# Patient Record
Sex: FEMALE | Race: BLACK OR AFRICAN AMERICAN | Employment: UNEMPLOYED | URBAN - METROPOLITAN AREA
[De-identification: names, ages, dates, MRNs, and addresses within clinical notes are randomized per-mention and may not be internally consistent; named-entity substitution may affect disease eponyms.]

---

## 2018-10-15 ENCOUNTER — HOSPITAL ENCOUNTER (EMERGENCY)
Age: 33
Discharge: HOME | End: 2018-10-15

## 2018-10-15 VITALS
BODY MASS INDEX: 37.21 KG/M2 | HEIGHT: 63 IN | TEMPERATURE: 97.7 F | OXYGEN SATURATION: 98 % | HEART RATE: 63 BPM | DIASTOLIC BLOOD PRESSURE: 82 MMHG | RESPIRATION RATE: 15 BRPM | SYSTOLIC BLOOD PRESSURE: 160 MMHG | WEIGHT: 210 LBS

## 2018-10-15 DIAGNOSIS — L02.91 ABSCESS: Primary | ICD-10-CM

## 2018-10-15 PROCEDURE — 99282 EMERGENCY DEPT VISIT SF MDM: CPT

## 2018-10-15 RX ORDER — SULFAMETHOXAZOLE AND TRIMETHOPRIM 800; 160 MG/1; MG/1
1 TABLET ORAL 2 TIMES DAILY
Qty: 14 TABLET | Refills: 0 | Status: SHIPPED | OUTPATIENT
Start: 2018-10-15 | End: 2018-10-22

## 2018-10-15 RX ORDER — CEPHALEXIN 500 MG/1
500 CAPSULE ORAL 4 TIMES DAILY
Qty: 28 CAPSULE | Refills: 0 | Status: SHIPPED | OUTPATIENT
Start: 2018-10-15 | End: 2018-10-22

## 2018-10-15 ASSESSMENT — ENCOUNTER SYMPTOMS
NAUSEA: 0
ROS SKIN COMMENTS: POSSIBLE ABSCESS, SEE HPI
VOMITING: 0

## 2018-10-15 ASSESSMENT — PAIN DESCRIPTION - ORIENTATION: ORIENTATION: LEFT

## 2018-10-15 ASSESSMENT — PAIN SCALES - GENERAL: PAINLEVEL_OUTOF10: 6

## 2019-01-10 ENCOUNTER — HOSPITAL ENCOUNTER (EMERGENCY)
Age: 34
Discharge: HOME | End: 2019-01-10
Attending: EMERGENCY MEDICINE
Payer: COMMERCIAL

## 2019-01-10 VITALS
WEIGHT: 198 LBS | TEMPERATURE: 98.5 F | SYSTOLIC BLOOD PRESSURE: 119 MMHG | DIASTOLIC BLOOD PRESSURE: 82 MMHG | BODY MASS INDEX: 35.08 KG/M2 | HEIGHT: 63 IN | HEART RATE: 57 BPM | RESPIRATION RATE: 16 BRPM | OXYGEN SATURATION: 99 %

## 2019-01-10 DIAGNOSIS — N93.9 ABNORMAL VAGINAL BLEEDING: Primary | ICD-10-CM

## 2019-01-10 LAB
A/G RATIO: 1.2 (ref 1.1–2.2)
ALBUMIN SERPL-MCNC: 4 G/DL (ref 3.4–5)
ALP BLD-CCNC: 69 U/L (ref 40–129)
ALT SERPL-CCNC: 10 U/L (ref 10–40)
ANION GAP SERPL CALCULATED.3IONS-SCNC: 8 MMOL/L (ref 3–16)
APTT: 33.8 SEC (ref 26–36)
AST SERPL-CCNC: 21 U/L (ref 15–37)
BACTERIA: ABNORMAL /HPF
BASOPHILS ABSOLUTE: 0 K/UL (ref 0–0.2)
BASOPHILS RELATIVE PERCENT: 0.2 %
BILIRUB SERPL-MCNC: <0.2 MG/DL (ref 0–1)
BILIRUBIN URINE: NEGATIVE
BLOOD, URINE: ABNORMAL
BUN BLDV-MCNC: 6 MG/DL (ref 7–20)
CALCIUM SERPL-MCNC: 8.4 MG/DL (ref 8.3–10.6)
CHLORIDE BLD-SCNC: 105 MMOL/L (ref 99–110)
CLARITY: ABNORMAL
CO2: 24 MMOL/L (ref 21–32)
COLOR: YELLOW
CREAT SERPL-MCNC: 0.6 MG/DL (ref 0.6–1.1)
EOSINOPHILS ABSOLUTE: 0.2 K/UL (ref 0–0.6)
EOSINOPHILS RELATIVE PERCENT: 4.2 %
EPITHELIAL CELLS, UA: 3 /HPF (ref 0–5)
GFR AFRICAN AMERICAN: >60
GFR NON-AFRICAN AMERICAN: >60
GLOBULIN: 3.4 G/DL
GLUCOSE BLD-MCNC: 115 MG/DL (ref 70–99)
GLUCOSE URINE: NEGATIVE MG/DL
HCG(URINE) PREGNANCY TEST: NEGATIVE
HCT VFR BLD CALC: 39.3 % (ref 36–48)
HEMOGLOBIN: 13.1 G/DL (ref 12–16)
HYALINE CASTS: 0 /LPF (ref 0–8)
INR BLD: 0.95 (ref 0.86–1.14)
KETONES, URINE: NEGATIVE MG/DL
LEUKOCYTE ESTERASE, URINE: ABNORMAL
LYMPHOCYTES ABSOLUTE: 1.7 K/UL (ref 1–5.1)
LYMPHOCYTES RELATIVE PERCENT: 37.9 %
MCH RBC QN AUTO: 31.6 PG (ref 26–34)
MCHC RBC AUTO-ENTMCNC: 33.3 G/DL (ref 31–36)
MCV RBC AUTO: 95.1 FL (ref 80–100)
MICROSCOPIC EXAMINATION: YES
MONOCYTES ABSOLUTE: 0.3 K/UL (ref 0–1.3)
MONOCYTES RELATIVE PERCENT: 7.2 %
NEUTROPHILS ABSOLUTE: 2.2 K/UL (ref 1.7–7.7)
NEUTROPHILS RELATIVE PERCENT: 50.5 %
NITRITE, URINE: NEGATIVE
PDW BLD-RTO: 16.8 % (ref 12.4–15.4)
PH UA: 6.5
PLATELET # BLD: 337 K/UL (ref 135–450)
PMV BLD AUTO: 7.8 FL (ref 5–10.5)
POTASSIUM SERPL-SCNC: 4.3 MMOL/L (ref 3.5–5.1)
PROTEIN UA: NEGATIVE MG/DL
PROTHROMBIN TIME: 10.8 SEC (ref 9.8–13)
RBC # BLD: 4.14 M/UL (ref 4–5.2)
RBC UA: 356 /HPF (ref 0–4)
SODIUM BLD-SCNC: 137 MMOL/L (ref 136–145)
SPECIFIC GRAVITY UA: 1.02
TOTAL PROTEIN: 7.4 G/DL (ref 6.4–8.2)
URINE REFLEX TO CULTURE: YES
URINE TYPE: ABNORMAL
UROBILINOGEN, URINE: 0.2 E.U./DL
WBC # BLD: 4.4 K/UL (ref 4–11)
WBC UA: 1 /HPF (ref 0–5)

## 2019-01-10 PROCEDURE — 80053 COMPREHEN METABOLIC PANEL: CPT

## 2019-01-10 PROCEDURE — 85025 COMPLETE CBC W/AUTO DIFF WBC: CPT

## 2019-01-10 PROCEDURE — 87086 URINE CULTURE/COLONY COUNT: CPT

## 2019-01-10 PROCEDURE — 96374 THER/PROPH/DIAG INJ IV PUSH: CPT

## 2019-01-10 PROCEDURE — 85610 PROTHROMBIN TIME: CPT

## 2019-01-10 PROCEDURE — 84703 CHORIONIC GONADOTROPIN ASSAY: CPT

## 2019-01-10 PROCEDURE — 85730 THROMBOPLASTIN TIME PARTIAL: CPT

## 2019-01-10 PROCEDURE — 6360000002 HC RX W HCPCS: Performed by: PHYSICIAN ASSISTANT

## 2019-01-10 PROCEDURE — 99283 EMERGENCY DEPT VISIT LOW MDM: CPT

## 2019-01-10 PROCEDURE — 81001 URINALYSIS AUTO W/SCOPE: CPT

## 2019-01-10 RX ORDER — KETOROLAC TROMETHAMINE 30 MG/ML
30 INJECTION, SOLUTION INTRAMUSCULAR; INTRAVENOUS ONCE
Status: COMPLETED | OUTPATIENT
Start: 2019-01-10 | End: 2019-01-10

## 2019-01-10 RX ADMIN — KETOROLAC TROMETHAMINE 30 MG: 30 INJECTION, SOLUTION INTRAMUSCULAR at 15:25

## 2019-01-10 ASSESSMENT — ENCOUNTER SYMPTOMS
BACK PAIN: 0
VOMITING: 0
DIARRHEA: 0
SHORTNESS OF BREATH: 0
ABDOMINAL PAIN: 1
BLOOD IN STOOL: 0
NAUSEA: 0

## 2019-01-10 ASSESSMENT — PAIN SCALES - GENERAL: PAINLEVEL_OUTOF10: 5

## 2019-01-10 ASSESSMENT — PAIN DESCRIPTION - ORIENTATION: ORIENTATION: LOWER

## 2019-01-10 ASSESSMENT — PAIN DESCRIPTION - LOCATION: LOCATION: BACK;ABDOMEN

## 2019-01-11 LAB — URINE CULTURE, ROUTINE: NORMAL

## 2019-06-06 ENCOUNTER — HOSPITAL ENCOUNTER (EMERGENCY)
Age: 34
Discharge: HOME | End: 2019-06-06
Payer: COMMERCIAL

## 2019-06-06 VITALS
HEART RATE: 94 BPM | SYSTOLIC BLOOD PRESSURE: 144 MMHG | RESPIRATION RATE: 16 BRPM | DIASTOLIC BLOOD PRESSURE: 92 MMHG | WEIGHT: 198 LBS | TEMPERATURE: 98.6 F | BODY MASS INDEX: 35.07 KG/M2 | OXYGEN SATURATION: 97 %

## 2019-06-06 DIAGNOSIS — R23.8 SKIN IRRITATION: Primary | ICD-10-CM

## 2019-06-06 PROCEDURE — 99282 EMERGENCY DEPT VISIT SF MDM: CPT

## 2019-06-06 ASSESSMENT — ENCOUNTER SYMPTOMS
NAUSEA: 0
ABDOMINAL PAIN: 0
DIARRHEA: 0
COLOR CHANGE: 0
ANAL BLEEDING: 0
BLOOD IN STOOL: 0
RECTAL PAIN: 1
CONSTIPATION: 0
COUGH: 0
ABDOMINAL DISTENTION: 0
SHORTNESS OF BREATH: 0
RESPIRATORY NEGATIVE: 1
VOMITING: 0

## 2019-06-06 ASSESSMENT — PAIN SCALES - GENERAL: PAINLEVEL_OUTOF10: 10

## 2019-06-06 ASSESSMENT — PAIN DESCRIPTION - LOCATION: LOCATION: BUTTOCKS

## 2019-06-06 NOTE — ED PROVIDER NOTES
[address] [facility]        Pt Name: Juany Wakefield  MRN: [id number]  [place] 1985  Date of evaluation: 6/6/2019  Provider: KJ Briggs  PCP: No primary care provider on file. This patient was not seen and evaluated by the attending physician but available for consultation as needed. CHIEF COMPLAINT       Chief Complaint   Patient presents with    Abscess     Pt states she noticed abscess in butt crack area approx 2 days ago       HISTORY OF PRESENT ILLNESS   (Location/Symptom, Timing/Onset, Context/Setting, Quality, Duration, Modifying Factors, Severity)  Note limiting factors. Juany Wakefield is a 29 y.o. female with no significant past medical history who presents to the ED with complaint of pain to the rectal area. Patient states she is concerned she has an abscess. Patient states 2 days ago she started developing some irritation to the gluteal cleft near the rectum. Patient states pain worse with palpation and rubbing of the area. Denies any injury or trauma. Denies anal intercourse or foreign body insertion to the rectum. Denies changes in bowel movements, urinary symptoms, fever/chills, vaginal bleeding, vaginal discharge, rashes/lesions, abdominal pain, nausea/vomiting or decreased oral intake. Patient states pain is described as a burning rated 10/10. Nursing Notes were all reviewed and agreed with or any disagreements were addressed  in the HPI. REVIEW OF SYSTEMS    (2-9 systems for level 4, 10 or more for level 5)     Review of Systems   Constitutional: Negative for activity change, appetite change, chills and fever. Respiratory: Negative. Negative for cough and shortness of breath. Cardiovascular: Negative. Negative for chest pain, palpitations and leg swelling. Gastrointestinal: Positive for rectal pain.  Negative for abdominal distention, abdominal pain, anal bleeding, blood in stool, constipation, diarrhea, nausea and vomiting. Genitourinary: Negative for decreased urine volume, difficulty urinating, dysuria, flank pain, frequency, hematuria, pelvic pain, urgency, vaginal bleeding, vaginal discharge and vaginal pain. Musculoskeletal: Negative for arthralgias, myalgias, neck pain and neck stiffness. Skin: Negative for color change, pallor, rash and wound. Neurological: Negative for dizziness and light-headedness. Positives and Pertinent negatives as per HPI. Except as noted abovein the ROS, all other systems were reviewed and negative. PAST MEDICAL HISTORY     Past Medical History:   Diagnosis Date    BV (bacterial vaginosis)     DUB (dysfunctional uterine bleeding)          SURGICAL HISTORY     Past Surgical History:   Procedure Laterality Date     SECTION           CURRENTMEDICATIONS       Discharge Medication List as of 2019  1:09 AM      CONTINUE these medications which have NOT CHANGED    Details   MetroNIDAZOLE (FLAGYL PO) Take by mouthHistorical Med      ibuprofen (ADVIL;MOTRIN) 600 MG tablet Take 1 tablet by mouth every 6 hours as needed for Pain, Disp-40 tablet, R-0Print               ALLERGIES     Patient has no known allergies. FAMILYHISTORY     History reviewed. No pertinent family history.        SOCIAL HISTORY       Social History     Socioeconomic History    Marital status:      Spouse name: None    Number of children: None    Years of education: None    Highest education level: None   Occupational History    None   Social Needs    Financial resource strain: None    Food insecurity:     Worry: None     Inability: None    Transportation needs:     Medical: None     Non-medical: None   Tobacco Use    Smoking status: Current Every Day Smoker     Packs/day: 0.50     Types: Cigars    Smokeless tobacco: Never Used   Substance and Sexual Activity    Alcohol use: Yes     Comment: occasionally    Drug use: Yes     Types: Marijuana    Sexual activity: Yes     Partners: Male   Lifestyle    Physical activity:     Days per week: None     Minutes per session: None    Stress: None   Relationships    Social connections:     Talks on phone: None     Gets together: None     Attends Shinto service: None     Active member of club or organization: None     Attends meetings of clubs or organizations: None     Relationship status: None    Intimate partner violence:     Fear of current or ex partner: None     Emotionally abused: None     Physically abused: None     Forced sexual activity: None   Other Topics Concern    None   Social History Narrative    None       SCREENINGS             PHYSICAL EXAM    (up to 7 for level 4, 8 or more for level 5)     ED Triage Vitals [06/06/19 0032]   BP Temp Temp src Pulse Resp SpO2 Height Weight   (!) 144/92 98.6 °F (37 °C) -- 94 16 97 % -- 198 lb (89.8 kg)       Physical Exam   Constitutional: She is oriented to person, place, and time. She appears well-developed and well-nourished. HENT:   Head: Normocephalic and atraumatic. Right Ear: External ear normal.   Left Ear: External ear normal.   Eyes: Right eye exhibits no discharge. Left eye exhibits no discharge. Neck: Normal range of motion. Neck supple. Pulmonary/Chest: Effort normal. No stridor. No respiratory distress. Abdominal: Soft. Bowel sounds are normal. She exhibits no distension and no mass. There is no hepatosplenomegaly. There is no tenderness. There is no rigidity, no rebound, no guarding and no CVA tenderness. No hernia. Genitourinary:   Genitourinary Comments: Upon examination of the gluteal cleft patient has a area of skin breakdown and associated abrasion noted. Tenderness palpation of this area. No erythema or warmth. No induration or fluctuance. Does not involve the rectum. No bleeding or drainage. No vesicles. No evidence of herpes. Musculoskeletal: Normal range of motion.    Neurological: She is alert and oriented to person, place, and time. Skin: Skin is warm and dry. She is not diaphoretic. No erythema. No pallor. Psychiatric: She has a normal mood and affect. Her behavior is normal.       DIAGNOSTIC RESULTS   LABS:    Labs Reviewed - No data to display    All other labs were within normal range or not returned as of this dictation. EKG: All EKG's are interpreted by the Emergency Department Physician who either signs orCo-signs this chart in the absence of a cardiologist.  Please see their note for interpretation of EKG. RADIOLOGY:   Non-plain film images such as CT, Ultrasound and MRI are read by the radiologist. Plain radiographic images are visualized andpreliminarily interpreted by the  ED Provider with the below findings:        Interpretation [facility] Radiologist below, if available at the time of this note:    No orders to display     No results found. PROCEDURES   Unless otherwise noted below, none     Procedures    CRITICAL CARE TIME   N/A    CONSULTS:  None      EMERGENCY DEPARTMENT COURSE and DIFFERENTIALDIAGNOSIS/MDM:   Vitals:    Vitals:    06/06/19 0032   BP: (!) 144/92   Pulse: 94   Resp: 16   Temp: 98.6 °F (37 °C)   SpO2: 97%   Weight: 198 lb (89.8 kg)       Patient was given thefollowing medications:  Medications - No data to display    Patient is a 71-year-old female who presents ED with complaint of possible abscess. Upon examination patient does not have an abscess but has what appears to be some skin irritation and skin breakdown noted to the gluteal cleft. Most likely due to trauma from rubbing. No evidence of herpes. No evidence of induration, fluctuance or infection. Given history and physical examination likely suffering from significant irritation. Given topical cream to help soothe the irritation. Follow-up with PCP. Return ED for any worsening symptoms.   Low suspicion for abscess, cellulitis, herpes, syphilis, proctitis, fistula, fissure, hemorrhoid, rectal involvement or

## 2019-06-06 NOTE — LETTER
[facility] Emergency Department  [address] [facility], [address]             June 6, 2019    Patient: Kareem Phelps   YOB: 1985   Date of Visit: 6/6/2019       To Whom It May Concern:    Yon Sellers was seen and treated in our emergency department on 6/6/2019. She may return to work on 06/07/19. Sincerely,         CHECO Clifton PA-C/

## 2020-01-08 ENCOUNTER — HOSPITAL ENCOUNTER (EMERGENCY)
Age: 35
Discharge: HOME | End: 2020-01-08
Attending: EMERGENCY MEDICINE
Payer: COMMERCIAL

## 2020-01-08 VITALS
TEMPERATURE: 99.2 F | WEIGHT: 200 LBS | BODY MASS INDEX: 35.44 KG/M2 | RESPIRATION RATE: 14 BRPM | DIASTOLIC BLOOD PRESSURE: 93 MMHG | HEIGHT: 63 IN | HEART RATE: 73 BPM | OXYGEN SATURATION: 100 % | SYSTOLIC BLOOD PRESSURE: 155 MMHG

## 2020-01-08 LAB
A/G RATIO: 1.2 (ref 1.1–2.2)
ABO/RH: NORMAL
ALBUMIN SERPL-MCNC: 3.9 G/DL (ref 3.4–5)
ALP BLD-CCNC: 53 U/L (ref 40–129)
ALT SERPL-CCNC: 8 U/L (ref 10–40)
ANION GAP SERPL CALCULATED.3IONS-SCNC: 17 MMOL/L (ref 3–16)
ANTIBODY SCREEN: NORMAL
AST SERPL-CCNC: 27 U/L (ref 15–37)
BACTERIA WET PREP: ABNORMAL
BASOPHILS ABSOLUTE: 0.1 K/UL (ref 0–0.2)
BASOPHILS RELATIVE PERCENT: 1.3 %
BILIRUB SERPL-MCNC: <0.2 MG/DL (ref 0–1)
BUN BLDV-MCNC: 8 MG/DL (ref 7–20)
CALCIUM SERPL-MCNC: 8.4 MG/DL (ref 8.3–10.6)
CHLORIDE BLD-SCNC: 101 MMOL/L (ref 99–110)
CLUE CELLS: ABNORMAL
CO2: 18 MMOL/L (ref 21–32)
CREAT SERPL-MCNC: 0.5 MG/DL (ref 0.6–1.1)
EOSINOPHILS ABSOLUTE: 0.2 K/UL (ref 0–0.6)
EOSINOPHILS RELATIVE PERCENT: 4 %
EPITHELIAL CELLS WET PREP: ABNORMAL
GFR AFRICAN AMERICAN: >60
GFR NON-AFRICAN AMERICAN: >60
GLOBULIN: 3.2 G/DL
GLUCOSE BLD-MCNC: 95 MG/DL (ref 70–99)
GONADOTROPIN, CHORIONIC (HCG) QUANT: <5 MIU/ML
HCT VFR BLD CALC: 37.8 % (ref 36–48)
HEMOGLOBIN: 12.7 G/DL (ref 12–16)
LYMPHOCYTES ABSOLUTE: 3.1 K/UL (ref 1–5.1)
LYMPHOCYTES RELATIVE PERCENT: 49.5 %
MCH RBC QN AUTO: 31.9 PG (ref 26–34)
MCHC RBC AUTO-ENTMCNC: 33.7 G/DL (ref 31–36)
MCV RBC AUTO: 94.8 FL (ref 80–100)
MONOCYTES ABSOLUTE: 0.4 K/UL (ref 0–1.3)
MONOCYTES RELATIVE PERCENT: 5.9 %
NEUTROPHILS ABSOLUTE: 2.5 K/UL (ref 1.7–7.7)
NEUTROPHILS RELATIVE PERCENT: 39.3 %
PDW BLD-RTO: 16 % (ref 12.4–15.4)
PLATELET # BLD: 293 K/UL (ref 135–450)
PMV BLD AUTO: 9.1 FL (ref 5–10.5)
POTASSIUM REFLEX MAGNESIUM: 4.3 MMOL/L (ref 3.5–5.1)
RBC # BLD: 3.99 M/UL (ref 4–5.2)
RBC WET PREP: ABNORMAL
SODIUM BLD-SCNC: 136 MMOL/L (ref 136–145)
SOURCE WET PREP: ABNORMAL
TOTAL PROTEIN: 7.1 G/DL (ref 6.4–8.2)
TRICHOMONAS PREP: ABNORMAL
WBC # BLD: 6.2 K/UL (ref 4–11)
WBC WET PREP: ABNORMAL
YEAST WET PREP: ABNORMAL

## 2020-01-08 PROCEDURE — 86850 RBC ANTIBODY SCREEN: CPT

## 2020-01-08 PROCEDURE — 87491 CHLMYD TRACH DNA AMP PROBE: CPT

## 2020-01-08 PROCEDURE — 85025 COMPLETE CBC W/AUTO DIFF WBC: CPT

## 2020-01-08 PROCEDURE — 96372 THER/PROPH/DIAG INJ SC/IM: CPT

## 2020-01-08 PROCEDURE — 86900 BLOOD TYPING SEROLOGIC ABO: CPT

## 2020-01-08 PROCEDURE — 99283 EMERGENCY DEPT VISIT LOW MDM: CPT

## 2020-01-08 PROCEDURE — 6360000002 HC RX W HCPCS: Performed by: PHYSICIAN ASSISTANT

## 2020-01-08 PROCEDURE — 84702 CHORIONIC GONADOTROPIN TEST: CPT

## 2020-01-08 PROCEDURE — 87591 N.GONORRHOEAE DNA AMP PROB: CPT

## 2020-01-08 PROCEDURE — 86901 BLOOD TYPING SEROLOGIC RH(D): CPT

## 2020-01-08 PROCEDURE — 87210 SMEAR WET MOUNT SALINE/INK: CPT

## 2020-01-08 PROCEDURE — 80053 COMPREHEN METABOLIC PANEL: CPT

## 2020-01-08 RX ORDER — KETOROLAC TROMETHAMINE 30 MG/ML
30 INJECTION, SOLUTION INTRAMUSCULAR; INTRAVENOUS ONCE
Status: COMPLETED | OUTPATIENT
Start: 2020-01-08 | End: 2020-01-08

## 2020-01-08 RX ADMIN — KETOROLAC TROMETHAMINE 30 MG: 30 INJECTION, SOLUTION INTRAMUSCULAR at 06:55

## 2020-01-08 ASSESSMENT — PAIN SCALES - GENERAL
PAINLEVEL_OUTOF10: 4
PAINLEVEL_OUTOF10: 7
PAINLEVEL_OUTOF10: 7

## 2020-01-08 NOTE — ED PROVIDER NOTES
signs of PID. She is not pregnant and her abdominal exam is benign. No profound active bleeding on pelvic exam per PA. Wet prep with <1+ clue cells but this is without associated sx and likely related to blood rather than true BV, therefore will not treat at this time. During the patient's ED course, the patient was given:  Medications   ketorolac (TORADOL) injection 30 mg (30 mg Intramuscular Given 1/8/20 0655)        CLINICAL IMPRESSION  1. Abnormal uterine bleeding (AUB)        DISPOSITION  Erica Thurman was discharged to home in stable condition. I have discussed the findings of today's workup with the patient and addressed the patient's questions and concerns. Important warning signs as well as new or worsening symptoms which would necessitate immediate return to the ED were discussed. The plan is to discharge from the ED at this time, and the patient is in stable condition. The patient acknowledged understanding is agreeable with this plan. Follow-up with:  Your gynecologist    Schedule an appointment as soon as possible for a visit in 1 week  For symptom re-evaluation    [facility] Emergency Department  [address] [place] [address]  [phone]  Go to   If symptoms worsen      This chart was created using Dragon dictation software. Efforts were made by me to ensure accuracy, however some errors may be present due to limitations of this technology.             Lyndsay English MD  01/08/20 5332
Abdomen is soft. There is no mass. Tenderness: There is no tenderness. There is no guarding or rebound. Hernia: No hernia is present. Genitourinary:     Comments: Pelvic exam performed with female chaperone at bedside reveals no external rash, erythema or vesicle. There is moderate amount of vaginal bleeding noted on exam.  No vaginal laceration or foreign body noted. No masses. No clots or tissue. Musculoskeletal: Normal range of motion. General: No swelling. Skin:     General: Skin is warm and dry. Findings: No erythema or rash. Neurological:      Mental Status: She is alert and oriented to person, place, and time. Cranial Nerves: No cranial nerve deficit.    Psychiatric:         Behavior: Behavior normal.         DIAGNOSTIC RESULTS   LABS:    Labs Reviewed   WET PREP, GENITAL - Abnormal; Notable for the following components:       Result Value    Clue Cells, Wet Prep <1+ (*)     All other components within normal limits    Narrative:     Performed at:  [facility] [address], [place], [address]   Phone [phone]   CBC WITH AUTO DIFFERENTIAL - Abnormal; Notable for the following components:    RBC 3.99 (*)     RDW 16.0 (*)     All other components within normal limits    Narrative:     Performed at:  [facility] [address], [place], [address]   Phone [phone]   COMPREHENSIVE METABOLIC PANEL W/ REFLEX TO MG FOR LOW K - Abnormal; Notable for the following components:    CO2 18 (*)     Anion Gap 17 (*)     CREATININE 0.5 (*)     ALT 8 (*)     All other components within normal limits    Narrative:     Performed at:  [facility] [address], [place], [address]   Phone [phone]   C.TRACHOMATIS N.GONORRHOEAE DNA   HCG, QUANTITATIVE, PREGNANCY    Narrative:     Performed at:  [facility] [address], [place], [place]

## 2020-01-09 LAB
C TRACH DNA GENITAL QL NAA+PROBE: NEGATIVE
N. GONORRHOEAE DNA: NEGATIVE

## 2020-09-11 ENCOUNTER — HOSPITAL ENCOUNTER (EMERGENCY)
Age: 35
Discharge: HOME | End: 2020-09-11
Payer: COMMERCIAL

## 2020-09-11 VITALS
SYSTOLIC BLOOD PRESSURE: 136 MMHG | BODY MASS INDEX: 37.76 KG/M2 | HEART RATE: 72 BPM | RESPIRATION RATE: 18 BRPM | DIASTOLIC BLOOD PRESSURE: 97 MMHG | OXYGEN SATURATION: 100 % | WEIGHT: 213.19 LBS | TEMPERATURE: 98.1 F

## 2020-09-11 PROCEDURE — 99283 EMERGENCY DEPT VISIT LOW MDM: CPT

## 2020-09-11 RX ORDER — NAPROXEN 500 MG/1
500 TABLET ORAL 2 TIMES DAILY WITH MEALS
Qty: 20 TABLET | Refills: 0 | Status: SHIPPED | OUTPATIENT
Start: 2020-09-11 | End: 2020-09-30

## 2020-09-11 ASSESSMENT — PAIN DESCRIPTION - ORIENTATION
ORIENTATION: RIGHT
ORIENTATION: RIGHT

## 2020-09-11 ASSESSMENT — PAIN DESCRIPTION - LOCATION: LOCATION: HAND

## 2020-09-11 NOTE — ED NOTES
Bed: B-08  Expected date:   Expected time:   Means of arrival:   Comments:  Gianfranco Grover RN  09/11/20 1953

## 2020-09-12 NOTE — ED PROVIDER NOTES
[address]  [address] [address]  Dept: [phone]  Loc: [address] ENCOUNTER        This patient was not seen or evaluated by the attending physician. I evaluated this patient, the attending physician was available for consultation. CHIEF COMPLAINT    Chief Complaint   Patient presents with    Numbness     patient states that she cut her pinky finger two weeks ago. pt. states that she started having numbness in that finger and around the area 1 week ago. pt. states that she is now having shooting pains going up her hand. HPI    Shirley Martinez is a 36 y.o. female who presents with right finger pain and numbness. The onset was about 2 weeks ago. The duration has been constant since the onset. The patient has no other associated injury. The context is that she cut the outside of her right fifth finger about 2 weeks ago when she was putting away opened cans. She states that she immediately cleaned it out and kept covered and clean. She states that she felt numbness in her pinky at that time, but that it really did not bother her that much. Over the course of the last 2 weeks she notes that she has had some numbness and pain in her ring finger and her pinky finger. She is unsure if it is related to her cut or something else. She reports that sometimes this pain will shoot up into the outside of her hand and her wrist area. She notes that she is left-hand dominant. She has not tried taking anything to treat her symptoms. She has no further complaints at this time. REVIEW OF SYSTEMS    Skin: Healed laceration, no puncture wounds  Musculoskeletal: see HPI, no other joint or bony injury or pain  Neurologic: No loss of consciousness, no head injury, no paresthesias or focal distal extremity weakness  All other systems reviewed and are negative.     PAST MEDICAL & SURGICAL Atraumatic, moist mucous membranes  NECK: normal range of motion,  supple   Respiratory:  No respiratory distress  Cardiovascular:  No JVD  Vascular: right radial pulse 2+, capillary refill less than 2 seconds  Musculoskeletal:  + tenderness to palpation of the ulnar aspect of the wrist with positive Tinels sign, no edema, no deformity. Fifth finger with normal sharp sensation to ulnar aspect, which is the same side that the laceration was noted to have occurred, there is no residual laceration noted and no evidence of infection. There is no tenderness or swelling to the finger. With full strength with flexion against resistance and extension. Equal  strength bilaterally. Patient reports that also has similar sensation to her ring finger on the right side. Integument:  Well hydrated, no skin lacerations, no erythema  Neurologic:  Awake alert, no slurred speech, sensory and motor intact    RADIOLOGY   No orders to display       ED [facility]   See chart for medications given during emergency department course. Differential diagnosis: includes but not limited to Arterial Injury/Ischemia, Fracture, Dislocation, Infection, Compartment Syndrome, Neurologic Deficit/Injury. The patient had a laceration to the ulnar aspect of her right pinky finger. However on exam, the sharp dull sensation is not diminished on that ulnar aspect, but rather a little bit more diminished on the radial aspect of the fifth finger as well as both ulnar and radial aspects of the fourth finger on the right hand. She does not have any weakness to the extremity. She does not have any fusiform swelling, or tenderness with flexion and extension against resistance. She has brisk capillary refill. I do not feel that there is any evidence of infection, but given the positive Tinel sign the description of the shooting pain going up into her hand and her wrist, I do feel that she may have early carpal tunnel.  She declined to wait to have films done at this time and said she'd rather just get the splint and go. She will be placed in a right splint, and given referral to hand for further evaluation and management. The patient was instructed to follow up as an outpatient in 2 days. The patient was instructed to return to the ED immediately for any new or worsening symptoms. The patient verbalized understanding. FINAL IMPRESSION    1. Hand pain, right    2.  Paresthesias in right hand        PLAN  Discharge with outpatient follow-up and discharge instructions (see EMR)    (Please note that this note was completed with a voice recognition program.  Every attempt was made to edit the dictations, but inevitably there remain words that are mis-transcribed.)         Wolfgang Guerrero, [address]  09/11/20 1769

## 2020-09-30 ENCOUNTER — OFFICE VISIT (OUTPATIENT)
Dept: ORTHOPEDIC SURGERY | Age: 35
End: 2020-09-30
Payer: COMMERCIAL

## 2020-09-30 VITALS — WEIGHT: 213 LBS | TEMPERATURE: 98.2 F | BODY MASS INDEX: 37.74 KG/M2 | RESPIRATION RATE: 16 BRPM | HEIGHT: 63 IN

## 2020-09-30 PROCEDURE — 99203 OFFICE O/P NEW LOW 30 MIN: CPT | Performed by: ORTHOPAEDIC SURGERY

## 2020-09-30 RX ORDER — MULTIVITAMIN
1 TABLET ORAL DAILY
COMMUNITY

## 2020-09-30 NOTE — PATIENT INSTRUCTIONS
Thank you for choosing [facility]) Physicians for your Hand and Upper Extremity needs. If we can be of any further assistance to you, please do not hesitate to contact us.     Office Phone Number:  [phone]  or  [phone]

## 2020-09-30 NOTE — PROGRESS NOTES
examination reveals normal and good capillary refill bilaterally  Swelling is minimal about the elbow on the Right, normal on the Left  There is no evidence of gross joint instability bilaterally. Muscular strength is clinically appropriate bilaterally. Examination for Cubital Tunnel Syndrome on the right shows no tenderness to palpation at the Medial epicondyle. The Ulnar Nerve rests behind the medial epicondyle without subluxation upon elbow flexion. Elbow flexion-compression test is Mildly Positive, and there is an active Tinnel's Sign over the Cubital Tunnel . The Ulnar Nerve innervated intrinsic musculature is not atrophied & weakened. Examination for Stenosing Tenosynovitis demonstrates no evidence of tenderness, thickening or nodularity at the A-1 pulleys of the digits bilaterally. There no palpable triggering or any finger or thumb. Examination for Carpal Tunnel Syndrome shows Carpal Tunnel Compression Test to be negative bilaterally. Phalen's Maneuver is negative bilaterally. The thenar musculature shows no evidence of atrophy or weakness. Impression:  Ms. Ramses Sabillon is showing clinical evidence of Ulnar Nerve entrapment at the Cubital Tunnel and presents requesting further treatment. Plan:  I have had a thorough discussion with Ms. Ramses Sabillon regarding the treatment options available for her initially presenting right  cubital tunnel syndrome, which is causing her significant symptoms and difficulty. I have outlined for Ms. Ramses Sabillon the risk, benefits and consequences of the various treatment modalities, including a reasonable expectation for the long term success of each. We have discussed the likelihood that further, more aggressive treatment may be required for her current presenting condition.   Based upon our current discussion and a reasonable understating of the options available to her, Ms. Ramses Sabillon has selected to proceed with a conservative plan of treatment consisting of: attention to elbow positioning and pressure,  activity modification, and the judicious use of over-the-counter anti-inflammatory medications if allowed by her primary care physician. Instructions were given regarding the use of other modalities as appropriate. I have clearly explained to her that the above outlined treatment plan should not be expected to 'cure' her  cubital tunnel syndrome, but we are rather treating the symptoms with which she presents. She has understood that in order to achieve more durable relief of her symptoms and to prevent future worsening or further damage, that definitive surgical treatment would be required. Ms. Beny Myers  voiced an appropriate understanding of our discussion, the options available to her, and of the expectations of her selected  treatment. I have discussed with Ms. Beny Myers that I can not be certain of the diagnosis of a specific focal nerve entrapment clinically nor can I exclude another site of nerve compression by my evaluation today. We have discussed the option of pursuing  Electrodiagnostic Studies to more fully evaluate her presenting symptoms and the underlying cause, and a prescription for EMG & Nerve Conduction Studies was provided. Ms. Beny Myers has been given a full verbal list of instructions and precautions related to her present condition. I have asked her to followup with me in the office at the prescribed time. She is also specifically requested to call or return to the office sooner if her symptoms change or worsen prior to the next scheduled appointment.

## 2020-12-31 ENCOUNTER — TELEPHONE (OUTPATIENT)
Dept: ORTHOPEDIC SURGERY | Age: 35
End: 2020-12-31

## 2020-12-31 NOTE — TELEPHONE ENCOUNTER
Other PATIENT CALLING CONCERNING A REFERRAL, DO NOT KNOW THE NAME OF THE DOCTOR SHE WAS REFERRED TO., CALLBACK [phone].

## 2020-12-31 NOTE — TELEPHONE ENCOUNTER
Called and spoke to patient she wanted to know where she needed to go to have the EMG done. I gave her central scheduling number. She also wanted to get set up to be evaluated for her Cervical spine. I have scheduled her the first available with [facility]. As [facility] doesn't have a new patient for a few weeks. She understands that [facility] staff may call life they have any questions or if they need to make any changes to this appt.      Ph: [phone] ([address] [place] [address])

## 2021-01-19 ENCOUNTER — HOSPITAL ENCOUNTER (OUTPATIENT)
Dept: NEUROLOGY | Age: 36
Discharge: HOME | End: 2021-01-19
Payer: COMMERCIAL

## 2021-01-19 PROCEDURE — 95908 NRV CNDJ TST 3-4 STUDIES: CPT

## 2021-01-19 PROCEDURE — 95886 MUSC TEST DONE W/N TEST COMP: CPT

## 2021-01-19 NOTE — PROCEDURES
Test Date:  2021    Patient: Bernardino Johnston : 1985 Physician: Maximiliano Woods DO   Sex: Female ID#:  Ref Phys: Chadwick Eaton MD     Patient Complaints:  Patient is a 30year-old female who presents with pain in the right upper extremity radiating to the shoulder onset in September. with neck pain. Patient History / Exam:  PMH: no endocrine disease, no neck or arm surgery PE: reflexes trace, + thumb opposition weakness    NCV & EMG Findings:  All nerve conduction studies (as indicated in the following tables) were within normal limits. All examined muscles (as indicated in the following table) showed no evidence of electrical instability. Impression:  Normal study without evidence of an acute radiculopathy, entrapment neuropathy or other lower motor neuron dysfunction.  Thank you         Maximiliano Woods DO        Nerve Conduction Studies  Motor Nerve Results      Latency Amplitude F-Lat Segment Distance CV Comment   Site (ms) Norm (mV) Norm (ms)  (cm) (m/s) Norm    Right Median (APB) Motor   Wrist 3.2  < 4.2 6.3  > 5.0         Elbow 6.1 - 7.4 -  Elbow-Wrist 20 69  > 50    Right Ulnar (ADM) Motor   Wrist 1.60  < 4.2 5.8  > 3.0         Bel Elbow 6.0 - 5.5 -  Bel Elbow-Wrist 22 50  > 50    Abv Elbow 6.4 - 5.1 -  Abv Elbow-Bel Elbow 3 75  > 48      Sensory Nerve Results      Latency (Peak) Amplitude (P-P) Segment Distance CV Comment   Site (ms) Norm (µV) Norm  (cm) (m/s) Norm    Right Median Sensory   Wrist-Dig II 3.2  < 3.6 38  > 10 Wrist-Dig II 14 44  > 39    Right Ulnar Sensory   Wrist-Dig V 2.9  < 3.7 33  > 15 Wrist-Dig V 14 48  > 38        Electromyography     Side Muscle Nerve Root Ins Act Fibs Psw Amp Dur Poly Recrt Int Ostrander Lyme Comment   Right Deltoid Axillary C5-C6 Nml Nml Nml Nml Nml 0 Nml Nml    Right Biceps Musculocut C5-C6 Nml Nml Nml Nml Nml 0 Nml Nml    Right Triceps Radial C6-C8 Nml Nml Nml Nml Nml 0 Nml Nml    Right Brachiorad Radial C5-C6 Nml Nml Nml Nml Nml 0 Nml Nml Right Pronator Teres Median C6-C7 Nml Nml Nml Nml Nml 0 Nml Nml    Right EIP Post Interosseous,  R... C7-C8 Nml Nml Nml Nml Nml 0 Nml Nml    Right APB Median C8-T1 Nml Nml Nml Nml Nml 0 Nml Nml    Right FDI Ulnar C8-T1 Nml Nml Nml Nml Nml 0 Nml Nml    Right Cervical Paraspinal (Uppe. .. Rami C1-C3 Nml Nml Nml      diff relax   Right Cervical Paraspinal (Mid) Rami C4-C6 Nml Nml Nml         Right Cervical Paraspinal (Cornelious Friar. ..  Rami C7-C8 Nml Nml Nml             Electronically signed by Anju Giron DO on 1/19/2021 at 11:00 AM

## 2022-01-27 ENCOUNTER — HOSPITAL ENCOUNTER (EMERGENCY)
Age: 37
Discharge: HOME | End: 2022-01-27
Payer: COMMERCIAL

## 2022-01-27 ENCOUNTER — APPOINTMENT (OUTPATIENT)
Dept: GENERAL RADIOLOGY | Age: 37
End: 2022-01-27
Payer: COMMERCIAL

## 2022-01-27 VITALS
RESPIRATION RATE: 18 BRPM | TEMPERATURE: 98.4 F | HEART RATE: 76 BPM | DIASTOLIC BLOOD PRESSURE: 100 MMHG | WEIGHT: 198 LBS | OXYGEN SATURATION: 100 % | SYSTOLIC BLOOD PRESSURE: 148 MMHG | BODY MASS INDEX: 35.07 KG/M2

## 2022-01-27 DIAGNOSIS — M77.8 TENDINITIS OF RIGHT WRIST: ICD-10-CM

## 2022-01-27 DIAGNOSIS — M65.4 TENDINITIS, DE QUERVAIN'S: Primary | ICD-10-CM

## 2022-01-27 PROCEDURE — 99283 EMERGENCY DEPT VISIT LOW MDM: CPT

## 2022-01-27 PROCEDURE — 73110 X-RAY EXAM OF WRIST: CPT

## 2022-01-27 PROCEDURE — 29125 APPL SHORT ARM SPLINT STATIC: CPT

## 2022-01-27 RX ORDER — IBUPROFEN 600 MG/1
600 TABLET ORAL
Qty: 30 TABLET | Refills: 0 | Status: SHIPPED | OUTPATIENT
Start: 2022-01-27

## 2022-01-27 ASSESSMENT — PAIN SCALES - GENERAL: PAINLEVEL_OUTOF10: 10

## 2022-01-27 NOTE — ED PROVIDER NOTES
[facility]        Pt Name: Padmini Boston  MRN: [id number]  [place] 1985  Date of evaluation: 2022  Provider: Ella Menchaca PA-C  PCP: No primary care provider on file. Note Started: 8:47 AM EST       MONICA. I have evaluated this patient. My supervising physician was available for consultation. Marquita Dubon MD      CHIEF COMPLAINT       Chief Complaint   Patient presents with    Wrist Pain     Pt reports right wrist pain, no known injury        HISTORY OF PRESENT ILLNESS   (Location, Timing/Onset, Context/Setting, Quality, Duration, Modifying Factors, Severity, Associated Signs and Symptoms)  Note limiting factors. Chief Complaint: Nondominant right wrist pain    Padmini Boston is a 29 y.o. female who presents with progressive right wrist pain beginning about 12-24 hours ago. States low radial aspect. Worse with movement. Works in a [employer] with repetitive use. She is left-hand dominant. She has not had previous similar. No trauma. She has had no medication. She is come out to be evaluated. Reports no paresthesia. No chest pain or shortness of breath. No fevers or chills. Nursing Notes were all reviewed and agreed with or any disagreements were addressed in the HPI. REVIEW OF SYSTEMS    (2-9 systems for level 4, 10 or more for level 5)     Review of Systems    Positives and Pertinent negatives as per HPI. Except as noted above in the ROS, all other systems were reviewed and negative.        PAST MEDICAL HISTORY     Past Medical History:   Diagnosis Date    BV (bacterial vaginosis)     DUB (dysfunctional uterine bleeding)          SURGICAL HISTORY     Past Surgical History:   Procedure Laterality Date     SECTION           CURRENTMEDICATIONS       Previous Medications    MULTIPLE VITAMIN (ONE-A-DAY ESSENTIAL) TABS    Take 1 tablet by mouth daily         ALLERGIES     Patient has no known allergies. FAMILYHISTORY     History reviewed. No pertinent family history. SOCIAL HISTORY       Social History     Tobacco Use    Smoking status: Current Every Day Smoker     Packs/day: 0.50     Types: Cigars    Smokeless tobacco: Never Used   Vaping Use    Vaping Use: Never used   Substance Use Topics    Alcohol use: Yes     Comment: 2 glasses [place] per day    Drug use: Yes     Types: Marijuana (Weed)       SCREENINGS             PHYSICAL EXAM    (up to 7 for level 4, 8 or more for level 5)     ED Triage Vitals   BP Temp Temp src Pulse Resp SpO2 Height Weight   01/27/22 0829 01/27/22 0829 -- 01/27/22 0829 01/27/22 0829 01/27/22 0829 -- 01/27/22 0828   (!) 148/100 98.4 °F (36.9 °C)  76 18 100 %  198 lb (89.8 kg)       Physical Exam  Vitals and nursing note reviewed. Constitutional:       Appearance: Normal appearance. She is well-developed and normal weight. HENT:      Head: Normocephalic and atraumatic. Right Ear: External ear normal.      Left Ear: External ear normal.   Eyes:      General: No scleral icterus. Right eye: No discharge. Left eye: No discharge. Conjunctiva/sclera: Conjunctivae normal.   Cardiovascular:      Rate and Rhythm: Normal rate. Pulmonary:      Effort: Pulmonary effort is normal.   Musculoskeletal:         General: Tenderness present. Normal range of motion. Cervical back: Normal range of motion and neck supple. Comments: Patient with tenderness along the radial tendon. Positive Finkelstein test.  No crepitation. Wrist noted refill all digits including the first and second digits. Sensory intact without paresthesia. Skin:     General: Skin is warm and dry. Neurological:      General: No focal deficit present. Mental Status: She is alert and oriented to person, place, and time. Mental status is at baseline.    Psychiatric:         Mood and Affect: Mood normal.         Behavior: Behavior normal.         Thought Content: Thought content normal.         Judgment: Judgment normal.         DIAGNOSTIC RESULTS   LABS:    Labs Reviewed - No data to display    When ordered only abnormal lab results are displayed. All other labs were within normal range or not returned as of this dictation. EKG: When ordered, EKG's are interpreted by the Emergency Department Physician in the absence of a cardiologist.  Please see their note for interpretation of EKG. RADIOLOGY:   Non-plain film images such as CT, Ultrasound and MRI are read by the radiologist. Plain radiographic images are visualized and preliminarily interpreted by the ED Provider with the below findings:        Interpretation per the Radiologist below, if available at the time of this note:    XR WRIST RIGHT (MIN 3 VIEWS)   Final Result   No acute osseous abnormality. No results found. PROCEDURES     A Velcro thumb spica splint applied by staff. Inspection of separate is appropriate placement without neurovascular compromise       Procedures    CRITICAL CARE TIME       CONSULTS:  None      EMERGENCY DEPARTMENT COURSE and DIFFERENTIAL DIAGNOSIS/MDM:   Vitals:    Vitals:    01/27/22 0828 01/27/22 0829   BP:  (!) 148/100   Pulse:  76   Resp:  18   Temp:  98.4 °F (36.9 °C)   SpO2:  100%   Weight: 198 lb (89.8 kg) 198 lb (89.8 kg)       Patient was given the following medications:  Medications - No data to display        Presenting with several day history of progressive pain nondominant right wrist along the radial aspect. X-ray negative. Suspect tendinitis and potentially de Quervain's tendinitis. Thumb spica splint applied x1 week. Heat, stretch massage recommended. Start ibuprofen 600 mg 3 times daily. She may add Tylenol 1000 mg 3 times daily for additional pain control. Referred to orthopedics. Patient did express understanding of her diagnosis and the treatment plan. FINAL IMPRESSION      1. Tendinitis, de Quervain's    2.  Tendinitis of right wrist DISPOSITION/PLAN   DISPOSITION Decision To Discharge 01/27/2022 09:31:22 AM      PATIENT REFERRED TO:  Florencia Strauss MD  [facility], [address] [place] [address],[address]  [address] [place] [address]  [phone]    Schedule an appointment as soon as possible for a visit on 2/1/2022      Your healthcare provider    Schedule an appointment as soon as possible for a visit   As needed    [facility]  [facility]  [phone]  Go to   If symptoms worsen      DISCHARGE MEDICATIONS:  New Prescriptions    IBUPROFEN (ADVIL;MOTRIN) 600 MG TABLET    Take 1 tablet by mouth 3 times daily (with meals)       DISCONTINUED MEDICATIONS:  Discontinued Medications    No medications on file              (Please note that portions of this note were completed with a voice recognition program.  Efforts were made to edit the dictations but occasionally words are mis-transcribed. )    Ann Marie Davis PA-C (electronically signed)           Ann Marie Davis PA-C  01/27/22 [id number]

## 2025-03-03 ENCOUNTER — HOSPITAL ENCOUNTER (EMERGENCY)
Age: 40
Discharge: HOME | End: 2025-03-03

## 2025-03-03 VITALS
TEMPERATURE: 99.3 F | WEIGHT: 211 LBS | DIASTOLIC BLOOD PRESSURE: 92 MMHG | HEIGHT: 62 IN | RESPIRATION RATE: 16 BRPM | HEART RATE: 78 BPM | SYSTOLIC BLOOD PRESSURE: 161 MMHG | BODY MASS INDEX: 38.83 KG/M2 | OXYGEN SATURATION: 98 %

## 2025-03-03 DIAGNOSIS — I10 ELEVATED BLOOD PRESSURE READING WITH DIAGNOSIS OF HYPERTENSION: ICD-10-CM

## 2025-03-03 DIAGNOSIS — Z20.6 HIV EXPOSURE: Primary | ICD-10-CM

## 2025-03-03 DIAGNOSIS — Z11.3 SCREENING EXAMINATION FOR STD (SEXUALLY TRANSMITTED DISEASE): ICD-10-CM

## 2025-03-03 LAB
ALBUMIN SERPL-MCNC: 4.3 G/DL (ref 3.4–5)
ALBUMIN/GLOB SERPL: 1.4 {RATIO} (ref 1.1–2.2)
ALP SERPL-CCNC: 77 U/L (ref 40–129)
ALT SERPL-CCNC: 8 U/L (ref 10–40)
ANION GAP SERPL CALCULATED.3IONS-SCNC: 7 MMOL/L (ref 3–16)
AST SERPL-CCNC: 14 U/L (ref 15–37)
BACTERIA GENITAL QL WET PREP: NORMAL
BACTERIA URNS QL MICRO: ABNORMAL /HPF
BILIRUB SERPL-MCNC: <0.2 MG/DL (ref 0–1)
BILIRUB UR QL STRIP.AUTO: NEGATIVE
BUN SERPL-MCNC: 12 MG/DL (ref 7–20)
CALCIUM SERPL-MCNC: 9 MG/DL (ref 8.3–10.6)
CHLORIDE SERPL-SCNC: 102 MMOL/L (ref 99–110)
CLARITY UR: CLEAR
CLUE CELLS SPEC QL WET PREP: NORMAL
CO2 SERPL-SCNC: 28 MMOL/L (ref 21–32)
COLOR UR: YELLOW
CREAT SERPL-MCNC: 0.8 MG/DL (ref 0.6–1.1)
EPI CELLS #/AREA URNS HPF: ABNORMAL /HPF (ref 0–5)
EPI CELLS SPEC QL WET PREP: NORMAL
GFR SERPLBLD CREATININE-BSD FMLA CKD-EPI: >90 ML/MIN/{1.73_M2}
GLUCOSE SERPL-MCNC: 96 MG/DL (ref 70–99)
GLUCOSE UR STRIP.AUTO-MCNC: NEGATIVE MG/DL
HBV CORE IGM SERPL QL IA: NORMAL
HBV SURFACE AB SERPL IA-ACNC: <3.5 MIU/ML
HBV SURFACE AG SERPL QL IA: NORMAL
HCG UR QL: NEGATIVE
HGB UR QL STRIP.AUTO: NEGATIVE
KETONES UR STRIP.AUTO-MCNC: ABNORMAL MG/DL
LEUKOCYTE ESTERASE UR QL STRIP.AUTO: NEGATIVE
MUCOUS THREADS #/AREA URNS LPF: ABNORMAL /LPF
NITRITE UR QL STRIP.AUTO: NEGATIVE
PH UR STRIP.AUTO: 5.5 [PH] (ref 5–8)
POTASSIUM SERPL-SCNC: 3.8 MMOL/L (ref 3.5–5.1)
PROT SERPL-MCNC: 7.4 G/DL (ref 6.4–8.2)
PROT UR STRIP.AUTO-MCNC: ABNORMAL MG/DL
RBC #/AREA URNS HPF: ABNORMAL /HPF (ref 0–4)
RBC SPEC QL WET PREP: NORMAL
SODIUM SERPL-SCNC: 137 MMOL/L (ref 136–145)
SP GR UR STRIP.AUTO: 1.02 (ref 1–1.03)
SPECIMEN SOURCE FLD: NORMAL
T VAGINALIS GENITAL QL WET PREP: NORMAL
UA COMPLETE W REFLEX CULTURE PNL UR: ABNORMAL
UA DIPSTICK W REFLEX MICRO PNL UR: YES
URN SPEC COLLECT METH UR: ABNORMAL
UROBILINOGEN UR STRIP-ACNC: 1 E.U./DL
WBC #/AREA URNS HPF: ABNORMAL /HPF (ref 0–5)
WBC SPEC QL WET PREP: NORMAL
YEAST GENITAL QL WET PREP: NORMAL

## 2025-03-03 PROCEDURE — 87491 CHLMYD TRACH DNA AMP PROBE: CPT

## 2025-03-03 PROCEDURE — 6370000000 HC RX 637 (ALT 250 FOR IP): Performed by: PHYSICIAN ASSISTANT

## 2025-03-03 PROCEDURE — 86706 HEP B SURFACE ANTIBODY: CPT

## 2025-03-03 PROCEDURE — 80053 COMPREHEN METABOLIC PANEL: CPT

## 2025-03-03 PROCEDURE — 87340 HEPATITIS B SURFACE AG IA: CPT

## 2025-03-03 PROCEDURE — 86705 HEP B CORE ANTIBODY IGM: CPT

## 2025-03-03 PROCEDURE — 87210 SMEAR WET MOUNT SALINE/INK: CPT

## 2025-03-03 PROCEDURE — 86702 HIV-2 ANTIBODY: CPT

## 2025-03-03 PROCEDURE — 81001 URINALYSIS AUTO W/SCOPE: CPT

## 2025-03-03 PROCEDURE — 86701 HIV-1ANTIBODY: CPT

## 2025-03-03 PROCEDURE — 84703 CHORIONIC GONADOTROPIN ASSAY: CPT

## 2025-03-03 PROCEDURE — 87390 HIV-1 AG IA: CPT

## 2025-03-03 PROCEDURE — 99283 EMERGENCY DEPT VISIT LOW MDM: CPT

## 2025-03-03 PROCEDURE — 87591 N.GONORRHOEAE DNA AMP PROB: CPT

## 2025-03-03 RX ORDER — EMTRICITABINE AND TENOFOVIR DISOPROXIL FUMARATE 200; 300 MG/1; MG/1
1 TABLET, FILM COATED ORAL ONCE
Status: DISCONTINUED | OUTPATIENT
Start: 2025-03-03 | End: 2025-03-03

## 2025-03-03 RX ORDER — EMTRICITABINE AND TENOFOVIR DISOPROXIL FUMARATE 200; 300 MG/1; MG/1
1 TABLET, FILM COATED ORAL DAILY
Qty: 28 TABLET | Refills: 0 | Status: SHIPPED | OUTPATIENT
Start: 2025-03-03

## 2025-03-03 RX ORDER — AMLODIPINE BESYLATE 5 MG/1
5 TABLET ORAL DAILY
COMMUNITY

## 2025-03-03 RX ORDER — EMTRICITABINE AND TENOFOVIR DISOPROXIL FUMARATE 200; 300 MG/1; MG/1
1 TABLET, FILM COATED ORAL ONCE
Status: COMPLETED | OUTPATIENT
Start: 2025-03-03 | End: 2025-03-03

## 2025-03-03 RX ORDER — EMTRICITABINE AND TENOFOVIR DISOPROXIL FUMARATE 200; 300 MG/1; MG/1
1 TABLET, FILM COATED ORAL DAILY
Qty: 28 TABLET | Refills: 0 | Status: SHIPPED | OUTPATIENT
Start: 2025-03-03 | End: 2025-03-03

## 2025-03-03 RX ORDER — AMLODIPINE BESYLATE 5 MG/1
5 TABLET ORAL DAILY
Qty: 30 TABLET | Refills: 0 | Status: SHIPPED | OUTPATIENT
Start: 2025-03-03

## 2025-03-03 RX ORDER — AMLODIPINE BESYLATE 5 MG/1
5 TABLET ORAL DAILY
Qty: 30 TABLET | Refills: 0 | Status: SHIPPED | OUTPATIENT
Start: 2025-03-03 | End: 2025-03-03

## 2025-03-03 RX ADMIN — EMTRICITABINE AND TENOFOVIR DISOPROXIL FUMARATE 1 TABLET: 200; 300 TABLET, FILM COATED ORAL at 15:45

## 2025-03-03 ASSESSMENT — PAIN - FUNCTIONAL ASSESSMENT
PAIN_FUNCTIONAL_ASSESSMENT: NONE - DENIES PAIN
PAIN_FUNCTIONAL_ASSESSMENT: NONE - DENIES PAIN

## 2025-03-03 ASSESSMENT — PAIN SCALES - GENERAL
PAINLEVEL_OUTOF10: 0
PAINLEVEL_OUTOF10: 0

## 2025-03-03 NOTE — ED PROVIDER NOTES
DISPOSITION Decision To Discharge 03/03/2025 03:24:06 PM   DISPOSITION CONDITION Stable           PATIENT REFERRED TO:  [facility]  [address]  [facility] [address]  [phone]  Go to   If symptoms worsen    [facility]  [address]  [address]  [facility] [address]  [phone]  Call in 1 day  For follow up and reevaluation.      DISCHARGE MEDICATIONS:  Discharge Medication List as of 3/3/2025  4:09 PM        START taking these medications    Details   !! amLODIPine (NORVASC) 5 MG tablet Take 1 tablet by mouth daily, Disp-30 tablet, R-0Normal      emtricitabine-tenofovir (TRUVADA) 200-300 MG per tablet Take 1 tablet by mouth daily, Disp-28 tablet, R-0Normal      dolutegravir sodium (TIVICAY) 50 MG tablet Take 1 tablet by mouth daily, Disp-28 tablet, R-0Normal       !! - Potential duplicate medications found. Please discuss with provider.          DISCONTINUED MEDICATIONS:  Discharge Medication List as of 3/3/2025  4:09 PM                 (Please note that portions of this note were completed with a voice recognition program.  Efforts were made to edit the dictations but occasionally words are mis-transcribed.)    Kim Street PA-C (electronically signed)           Kim Street PA-C  03/03/25 1610       Kim Street PA-C  03/03/25 [id number]

## 2025-03-03 NOTE — DISCHARGE INSTRUCTIONS
You need to establish care with a primary care doctor.  See our [facility] outpatient clinic for close follow-up.  You will need to have your liver enzymes obtained while you are on the medication and further HIV testing going forward.  It is important that you establish care.  Your blood pressure was also elevated in the ED.  We are placing back on amlodipine.  You need to follow-up with primary care doctor for continued management of blood pressure.    You can also follow up with the [facility] for further HIV screening. See their contact information attached.    We tested you for gonorrhea and chlamydia.  If these are positive we will contact you as we discussed.    See additional information you were provided regarding her HIV exposure.

## 2025-03-04 LAB
C TRACH DNA CVX QL NAA+PROBE: NEGATIVE
HIV 1+2 AB+HIV1 P24 AG SERPL QL IA: NORMAL
HIV 2 AB SERPL QL IA: NORMAL
HIV1 AB SERPL QL IA: NORMAL
HIV1 P24 AG SERPL QL IA: NORMAL
N GONORRHOEA DNA CERV MUCUS QL NAA+PROBE: NEGATIVE